# Patient Record
Sex: MALE | Race: WHITE | NOT HISPANIC OR LATINO | Employment: FULL TIME | URBAN - METROPOLITAN AREA
[De-identification: names, ages, dates, MRNs, and addresses within clinical notes are randomized per-mention and may not be internally consistent; named-entity substitution may affect disease eponyms.]

---

## 2023-04-03 ENCOUNTER — TELEPHONE (OUTPATIENT)
Dept: PRIMARY CARE | Facility: CLINIC | Age: 38
End: 2023-04-03
Payer: COMMERCIAL

## 2023-04-03 NOTE — TELEPHONE ENCOUNTER
Rx Refill Request Telephone Encounter    Name:  Júnior Cam  :  [id number]  Medication Name:  Diclofenac Sodium  Dose : 75  Route : oral  Quantity : 28 tablet  Directions : take 1 tablet twice daily  Specific Pharmacy location:  [facility], [address]  Date of last appointment:  12/15/2022  Date of next appointment:  N/A    Patient called and said he is seeing a specialist in a month, and was hoping for one more refill until he is seen.

## 2023-04-04 DIAGNOSIS — M25.50 ARTHRALGIA, UNSPECIFIED JOINT: Primary | ICD-10-CM

## 2023-04-04 RX ORDER — DICLOFENAC SODIUM 75 MG/1
75 TABLET, DELAYED RELEASE ORAL 2 TIMES DAILY PRN
Qty: 60 TABLET | Refills: 3 | Status: SHIPPED | OUTPATIENT
Start: 2023-04-04 | End: 2024-04-03

## 2023-05-12 ENCOUNTER — HOSPITAL ENCOUNTER (OUTPATIENT)
Dept: DATA CONVERSION | Facility: HOSPITAL | Age: 38
End: 2023-05-12
Attending: PHYSICAL MEDICINE & REHABILITATION | Admitting: PHYSICAL MEDICINE & REHABILITATION
Payer: COMMERCIAL

## 2023-05-12 DIAGNOSIS — M46.1 SACROILIITIS, NOT ELSEWHERE CLASSIFIED (CMS-HCC): ICD-10-CM

## 2023-09-07 VITALS
BODY MASS INDEX: 28.18 KG/M2 | SYSTOLIC BLOOD PRESSURE: 150 MMHG | WEIGHT: 196.87 LBS | DIASTOLIC BLOOD PRESSURE: 95 MMHG | HEART RATE: 76 BPM | HEIGHT: 70 IN | RESPIRATION RATE: 16 BRPM | TEMPERATURE: 97.5 F

## 2023-09-14 NOTE — H&P
History of Present Illness:   History Present Illness:  Reason for surgery: Chronic right SI Joint pain   HPI:    Chronic right SI Joint pain refractory to conservative treatment, achy and burning, worse with activity, better at rest, pain can be a 6/10.    Allergies:        Allergies:  ·  No Known Allergies :     Home Medication Review:   Home Medications Reviewed: yes     Impression/Procedure:   ·  Impression and Planned Procedure: Right  Sacroiliitis  Plan for right SI Joint injection       ERAS (Enhanced Recovery After Surgery):  ·  ERAS Patient: no       Vital Signs:  Temperature C: 36.4 degrees C   Temperature F: 97.5 degrees F   Heart Rate: 76 beats per minute   Respiratory Rate: 16 breath per minute   Blood Pressure Systolic: 150 mm/Hg   Blood Pressure Diastolic: 95 mm/Hg     Physical Exam by System:    Constitutional: Well developed, awake/alert/oriented,  no distress, alert and cooperative   Eyes: EOMI, clear sclera   ENMT: mucous membranes moist, no apparent injury,  no lesions seen   Head/Neck: Neck supple, no apparent injury, trachea  midline   Respiratory/Thorax: Patent airways, non-labored breathing  with good chest expansion   Cardiovascular: no sig. edema   Extremities: normal extremities, no cyanosis edema,  contusions or wounds, no clubbing   Psychological: Appropriate mood and behavior   Skin: Warm and dry, no lesions, no rashes     Consent:   COVID-19 Consent:  ·  COVID-19 Risk Consent Surgeon has reviewed key risks related to the risk of alfonzo COVID-19 and if they contract COVID-19 what the risks are.       Electronic Signatures:  Bridger Moore)  (Signed 12-May-2023 11:05)   Authored: History of Present Illness, Allergies, Home  Medication Review, Impression/Procedure, ERAS, Physical Exam, Consent, Note Completion      Last Updated: 12-May-2023 11:05 by Bridger Moore)

## 2023-10-02 NOTE — OP NOTE
"    Post Operative Note:     PreOp Diagnosis: Chronic right Sacroiliitis   Post-Procedure Diagnosis: same   Procedure: 1. Right SI Joint injection  2.   3.   4.   5.   Surgeon: ISABELLA Moore MD   Resident/Fellow/Other Assistant: Anuradha Bliss DO   Estimated Blood Loss (mL): none   Specimen: no   Findings: NA     Operative Report Dictated:  Dictation: not applicable - note contains Operative  Report   Operative Report:    Procedure(s):Operation: Right Sacro-Iliac Joint injection     Pre-Op/Pre-Procedure Diagnosis: Sacroiliitis  Post-Op Diagnosis: same    Anesthesia: Local only     Fluoroscopy time: See documented     Estimated Blood Loss: None  Specimens: None  Drains: None  Complications: None     INDICATIONS: The patient has been referred by my colleague with concordant subjective, objective, and radiologic findings of Sacroiliitis, referred for diagnostic and therapeutic Sacro-Iliac Joint injection(s) with failure of prior conservative care with  physical therapy and medications alone. At this time, the patient wishes to avoid surgery.      PROCEDURE: After obtaining both verbal and written informed consent, the patient was placed in a prone position on the fluoroscopic table in procedure room, the patient's posterior lumbosacral spine was prepped and draped in usual sterile fashion using  chlorhexidine. The patient was connected to noninvasive blood pressure, EKG, pulse oximetry monitoring, and monitored by a registered interventional nurse throughout the procedure. Before initiating procedure, all relevant information was verified in  a \"time-out.\"      Skin wheal(s) were raised using 1% preservative-free lidocaine near the inferior portion of the Sacro-iliac joint (s). Through the skin wheal a 22-gauge, 3-1/2-inch curved Quincke-tip spinal needle was inserted and advanced under direct fluoroscopic visualization  in the AP and lateral planes, until the needle tip entered the Sacro-iliac Joint. Proper needle " placement was confirmed with 0.2 cc of Omnipaque-180M nonionic contrast confirming intra-articular flow of contrast without any intravascular uptake of contrast  seen under direct fluoroscopic visualization in the AP, ipsilateral oblique, and lateral planes. At this point 80mg Depomedrol and 1 cc of 1.00% preservative- free Lidocaine were infused into the Sacro-Iliac Joint and gluteal musculature. Adequate hemostasis  was obtained at the needle puncture site. The patient's back was cleaned and a sterile dressing was applied. The patient was taken conscious and in stable condition to the recovery room. No complications as a result of this procedure. Post procedure precautions  and instructions were reviewed with the patient who verbalized understanding.      Significant Findings: None  Pre-Op Pain: 6/10  Post-Op Pain: 2/10  The patient had at least 3/5 positive provocative test on physical exam prior to injection.  The patient had no to minimal pain with the same provocative test after injection.     Care Instructions: Discharge per protocol.  Medications: See medication section  Appointment: Patient to return 2-3 weeks to clinic with pain diary.  Discharge Condition: Good condition for discharge.  Patient discharged home when all discharge criterion met.      Attestation:   Note Completion:  Attending Attestation I was present for the entire procedure         Electronic Signatures:  Bridger Moore)  (Signed 12-May-2023 11:32)   Authored: Post Operative Note, Note Completion      Last Updated: 12-May-2023 11:32 by Bridger Moore)

## 2023-10-28 ENCOUNTER — PREP FOR PROCEDURE (OUTPATIENT)
Dept: SURGERY | Facility: HOSPITAL | Age: 38
End: 2023-10-28

## 2023-10-28 ENCOUNTER — HOSPITAL ENCOUNTER (OUTPATIENT)
Facility: HOSPITAL | Age: 38
Setting detail: OBSERVATION
Discharge: HOME | End: 2023-10-29
Attending: INTERNAL MEDICINE | Admitting: SURGERY
Payer: COMMERCIAL

## 2023-10-28 ENCOUNTER — APPOINTMENT (OUTPATIENT)
Dept: RADIOLOGY | Facility: HOSPITAL | Age: 38
End: 2023-10-28
Payer: COMMERCIAL

## 2023-10-28 DIAGNOSIS — K35.30 ACUTE APPENDICITIS WITH LOCALIZED PERITONITIS, WITHOUT PERFORATION, ABSCESS, OR GANGRENE: Primary | ICD-10-CM

## 2023-10-28 DIAGNOSIS — K35.80 ACUTE APPENDICITIS, UNSPECIFIED ACUTE APPENDICITIS TYPE: Primary | ICD-10-CM

## 2023-10-28 DIAGNOSIS — K35.30 ACUTE APPENDICITIS WITH LOCALIZED PERITONITIS, WITHOUT PERFORATION, ABSCESS, OR GANGRENE: ICD-10-CM

## 2023-10-28 LAB
ALBUMIN SERPL BCP-MCNC: 5.2 G/DL (ref 3.4–5)
ALP SERPL-CCNC: 57 U/L (ref 33–120)
ALT SERPL W P-5'-P-CCNC: 60 U/L (ref 10–52)
ANION GAP SERPL CALC-SCNC: 12 MMOL/L (ref 10–20)
APPEARANCE UR: CLEAR
AST SERPL W P-5'-P-CCNC: 23 U/L (ref 9–39)
BASOPHILS # BLD AUTO: 0.02 X10*3/UL (ref 0–0.1)
BASOPHILS NFR BLD AUTO: 0.2 %
BILIRUB SERPL-MCNC: 0.9 MG/DL (ref 0–1.2)
BILIRUB UR STRIP.AUTO-MCNC: NEGATIVE MG/DL
BUN SERPL-MCNC: 19 MG/DL (ref 6–23)
CALCIUM SERPL-MCNC: 11.3 MG/DL (ref 8.6–10.3)
CAOX CRY #/AREA UR COMP ASSIST: NORMAL /HPF
CHLORIDE SERPL-SCNC: 102 MMOL/L (ref 98–107)
CO2 SERPL-SCNC: 27 MMOL/L (ref 21–32)
COLOR UR: YELLOW
CREAT SERPL-MCNC: 1.17 MG/DL (ref 0.5–1.3)
EOSINOPHIL # BLD AUTO: 0.07 X10*3/UL (ref 0–0.7)
EOSINOPHIL NFR BLD AUTO: 0.9 %
ERYTHROCYTE [DISTWIDTH] IN BLOOD BY AUTOMATED COUNT: 12.2 % (ref 11.5–14.5)
GFR SERPL CREATININE-BSD FRML MDRD: 83 ML/MIN/1.73M*2
GLUCOSE SERPL-MCNC: 104 MG/DL (ref 74–99)
GLUCOSE UR STRIP.AUTO-MCNC: NEGATIVE MG/DL
HCT VFR BLD AUTO: 47.9 % (ref 41–52)
HGB BLD-MCNC: 16.2 G/DL (ref 13.5–17.5)
IMM GRANULOCYTES # BLD AUTO: 0.02 X10*3/UL (ref 0–0.7)
IMM GRANULOCYTES NFR BLD AUTO: 0.2 % (ref 0–0.9)
KETONES UR STRIP.AUTO-MCNC: ABNORMAL MG/DL
LEUKOCYTE ESTERASE UR QL STRIP.AUTO: NEGATIVE
LIPASE SERPL-CCNC: 30 U/L (ref 9–82)
LYMPHOCYTES # BLD AUTO: 2.52 X10*3/UL (ref 1.2–4.8)
LYMPHOCYTES NFR BLD AUTO: 31.4 %
MCH RBC QN AUTO: 31 PG (ref 26–34)
MCHC RBC AUTO-ENTMCNC: 33.8 G/DL (ref 32–36)
MCV RBC AUTO: 92 FL (ref 80–100)
MONOCYTES # BLD AUTO: 0.73 X10*3/UL (ref 0.1–1)
MONOCYTES NFR BLD AUTO: 9.1 %
MUCOUS THREADS #/AREA URNS AUTO: NORMAL /LPF
NEUTROPHILS # BLD AUTO: 4.66 X10*3/UL (ref 1.2–7.7)
NEUTROPHILS NFR BLD AUTO: 58.2 %
NITRITE UR QL STRIP.AUTO: NEGATIVE
NRBC BLD-RTO: 0 /100 WBCS (ref 0–0)
PH UR STRIP.AUTO: 6 [PH]
PLATELET # BLD AUTO: 235 X10*3/UL (ref 150–450)
PMV BLD AUTO: 11.2 FL (ref 7.5–11.5)
POTASSIUM SERPL-SCNC: 4 MMOL/L (ref 3.5–5.3)
PROT SERPL-MCNC: 7.9 G/DL (ref 6.4–8.2)
PROT UR STRIP.AUTO-MCNC: ABNORMAL MG/DL
RBC # BLD AUTO: 5.23 X10*6/UL (ref 4.5–5.9)
RBC # UR STRIP.AUTO: NEGATIVE /UL
RBC #/AREA URNS AUTO: NORMAL /HPF
SODIUM SERPL-SCNC: 137 MMOL/L (ref 136–145)
SP GR UR STRIP.AUTO: 1.03
UROBILINOGEN UR STRIP.AUTO-MCNC: 2 MG/DL
WBC # BLD AUTO: 8 X10*3/UL (ref 4.4–11.3)
WBC #/AREA URNS AUTO: NORMAL /HPF

## 2023-10-28 PROCEDURE — 2500000004 HC RX 250 GENERAL PHARMACY W/ HCPCS (ALT 636 FOR OP/ED)

## 2023-10-28 PROCEDURE — 96361 HYDRATE IV INFUSION ADD-ON: CPT | Performed by: SURGERY

## 2023-10-28 PROCEDURE — 99285 EMERGENCY DEPT VISIT HI MDM: CPT | Mod: 25 | Performed by: INTERNAL MEDICINE

## 2023-10-28 PROCEDURE — 81001 URINALYSIS AUTO W/SCOPE: CPT | Performed by: PHYSICIAN ASSISTANT

## 2023-10-28 PROCEDURE — 74177 CT ABD & PELVIS W/CONTRAST: CPT

## 2023-10-28 PROCEDURE — 85025 COMPLETE CBC W/AUTO DIFF WBC: CPT | Performed by: PHYSICIAN ASSISTANT

## 2023-10-28 PROCEDURE — 83690 ASSAY OF LIPASE: CPT | Performed by: PHYSICIAN ASSISTANT

## 2023-10-28 PROCEDURE — 74177 CT ABD & PELVIS W/CONTRAST: CPT | Mod: FOREIGN READ | Performed by: RADIOLOGY

## 2023-10-28 PROCEDURE — 2550000001 HC RX 255 CONTRASTS: Performed by: INTERNAL MEDICINE

## 2023-10-28 PROCEDURE — 80053 COMPREHEN METABOLIC PANEL: CPT | Performed by: PHYSICIAN ASSISTANT

## 2023-10-28 PROCEDURE — 36415 COLL VENOUS BLD VENIPUNCTURE: CPT | Performed by: PHYSICIAN ASSISTANT

## 2023-10-28 RX ADMIN — SODIUM CHLORIDE, POTASSIUM CHLORIDE, SODIUM LACTATE AND CALCIUM CHLORIDE 1000 ML: 600; 310; 30; 20 INJECTION, SOLUTION INTRAVENOUS at 21:33

## 2023-10-28 RX ADMIN — IOHEXOL 75 ML: 350 INJECTION, SOLUTION INTRAVENOUS at 22:15

## 2023-10-28 ASSESSMENT — LIFESTYLE VARIABLES
HAVE PEOPLE ANNOYED YOU BY CRITICIZING YOUR DRINKING: NO
HAVE YOU EVER FELT YOU SHOULD CUT DOWN ON YOUR DRINKING: NO
REASON UNABLE TO ASSESS: NO
EVER HAD A DRINK FIRST THING IN THE MORNING TO STEADY YOUR NERVES TO GET RID OF A HANGOVER: NO
EVER FELT BAD OR GUILTY ABOUT YOUR DRINKING: NO

## 2023-10-28 ASSESSMENT — COLUMBIA-SUICIDE SEVERITY RATING SCALE - C-SSRS
2. HAVE YOU ACTUALLY HAD ANY THOUGHTS OF KILLING YOURSELF?: NO
1. IN THE PAST MONTH, HAVE YOU WISHED YOU WERE DEAD OR WISHED YOU COULD GO TO SLEEP AND NOT WAKE UP?: NO
6. HAVE YOU EVER DONE ANYTHING, STARTED TO DO ANYTHING, OR PREPARED TO DO ANYTHING TO END YOUR LIFE?: NO

## 2023-10-29 ENCOUNTER — ANESTHESIA (OUTPATIENT)
Dept: OPERATING ROOM | Facility: HOSPITAL | Age: 38
End: 2023-10-29
Payer: COMMERCIAL

## 2023-10-29 ENCOUNTER — APPOINTMENT (OUTPATIENT)
Dept: CARDIOLOGY | Facility: HOSPITAL | Age: 38
End: 2023-10-29
Payer: COMMERCIAL

## 2023-10-29 ENCOUNTER — ANESTHESIA EVENT (OUTPATIENT)
Dept: OPERATING ROOM | Facility: HOSPITAL | Age: 38
End: 2023-10-29
Payer: COMMERCIAL

## 2023-10-29 VITALS
SYSTOLIC BLOOD PRESSURE: 115 MMHG | BODY MASS INDEX: 29.64 KG/M2 | TEMPERATURE: 97 F | RESPIRATION RATE: 18 BRPM | HEART RATE: 51 BPM | HEIGHT: 70 IN | OXYGEN SATURATION: 95 % | DIASTOLIC BLOOD PRESSURE: 62 MMHG | WEIGHT: 207.01 LBS

## 2023-10-29 PROBLEM — K37 APPENDICITIS: Status: ACTIVE | Noted: 2023-10-29

## 2023-10-29 PROCEDURE — 88304 TISSUE EXAM BY PATHOLOGIST: CPT | Performed by: PATHOLOGY

## 2023-10-29 PROCEDURE — 3700000002 HC GENERAL ANESTHESIA TIME - EACH INCREMENTAL 1 MINUTE: Performed by: SURGERY

## 2023-10-29 PROCEDURE — 99222 1ST HOSP IP/OBS MODERATE 55: CPT | Performed by: NURSE PRACTITIONER

## 2023-10-29 PROCEDURE — 7100000002 HC RECOVERY ROOM TIME - EACH INCREMENTAL 1 MINUTE: Performed by: SURGERY

## 2023-10-29 PROCEDURE — A44970 PR LAP,APPENDECTOMY

## 2023-10-29 PROCEDURE — 99140 ANES COMP EMERGENCY COND: CPT | Performed by: ANESTHESIOLOGY

## 2023-10-29 PROCEDURE — G0378 HOSPITAL OBSERVATION PER HR: HCPCS

## 2023-10-29 PROCEDURE — 96365 THER/PROPH/DIAG IV INF INIT: CPT | Mod: 59 | Performed by: SURGERY

## 2023-10-29 PROCEDURE — 7100000001 HC RECOVERY ROOM TIME - INITIAL BASE CHARGE: Performed by: SURGERY

## 2023-10-29 PROCEDURE — 2500000004 HC RX 250 GENERAL PHARMACY W/ HCPCS (ALT 636 FOR OP/ED)

## 2023-10-29 PROCEDURE — 2500000004 HC RX 250 GENERAL PHARMACY W/ HCPCS (ALT 636 FOR OP/ED): Performed by: SURGERY

## 2023-10-29 PROCEDURE — 2500000005 HC RX 250 GENERAL PHARMACY W/O HCPCS

## 2023-10-29 PROCEDURE — 3600000004 HC OR TIME - INITIAL BASE CHARGE - PROCEDURE LEVEL FOUR: Performed by: SURGERY

## 2023-10-29 PROCEDURE — 88304 TISSUE EXAM BY PATHOLOGIST: CPT | Mod: TC,SUR | Performed by: SURGERY

## 2023-10-29 PROCEDURE — 93005 ELECTROCARDIOGRAM TRACING: CPT

## 2023-10-29 PROCEDURE — 2500000005 HC RX 250 GENERAL PHARMACY W/O HCPCS: Performed by: SURGERY

## 2023-10-29 PROCEDURE — 2720000007 HC OR 272 NO HCPCS: Performed by: SURGERY

## 2023-10-29 PROCEDURE — 3600000009 HC OR TIME - EACH INCREMENTAL 1 MINUTE - PROCEDURE LEVEL FOUR: Performed by: SURGERY

## 2023-10-29 PROCEDURE — 88304 TISSUE EXAM BY PATHOLOGIST: CPT | Mod: TC | Performed by: SURGERY

## 2023-10-29 PROCEDURE — 99223 1ST HOSP IP/OBS HIGH 75: CPT | Performed by: SURGERY

## 2023-10-29 PROCEDURE — A44970 PR LAP,APPENDECTOMY: Performed by: ANESTHESIOLOGY

## 2023-10-29 PROCEDURE — 3700000001 HC GENERAL ANESTHESIA TIME - INITIAL BASE CHARGE: Performed by: SURGERY

## 2023-10-29 PROCEDURE — 44970 LAPAROSCOPY APPENDECTOMY: CPT | Performed by: SURGERY

## 2023-10-29 RX ORDER — ACETAMINOPHEN 325 MG/1
650 TABLET ORAL EVERY 6 HOURS PRN
Status: DISCONTINUED | OUTPATIENT
Start: 2023-10-29 | End: 2023-10-29 | Stop reason: HOSPADM

## 2023-10-29 RX ORDER — PROMETHAZINE HYDROCHLORIDE 25 MG/1
25 SUPPOSITORY RECTAL EVERY 12 HOURS PRN
Status: DISCONTINUED | OUTPATIENT
Start: 2023-10-29 | End: 2023-10-29 | Stop reason: HOSPADM

## 2023-10-29 RX ORDER — OMEPRAZOLE 40 MG/1
1 CAPSULE, DELAYED RELEASE ORAL AS NEEDED
COMMUNITY
Start: 2017-04-21

## 2023-10-29 RX ORDER — LIDOCAINE HCL/PF 100 MG/5ML
SYRINGE (ML) INTRAVENOUS AS NEEDED
Status: DISCONTINUED | OUTPATIENT
Start: 2023-10-29 | End: 2023-10-29

## 2023-10-29 RX ORDER — GLYCOPYRROLATE 0.2 MG/ML
INJECTION INTRAMUSCULAR; INTRAVENOUS AS NEEDED
Status: DISCONTINUED | OUTPATIENT
Start: 2023-10-29 | End: 2023-10-29

## 2023-10-29 RX ORDER — HYDROMORPHONE HYDROCHLORIDE 1 MG/ML
1 INJECTION, SOLUTION INTRAMUSCULAR; INTRAVENOUS; SUBCUTANEOUS EVERY 5 MIN PRN
Status: DISCONTINUED | OUTPATIENT
Start: 2023-10-29 | End: 2023-10-29 | Stop reason: HOSPADM

## 2023-10-29 RX ORDER — KETOROLAC TROMETHAMINE 30 MG/ML
INJECTION, SOLUTION INTRAMUSCULAR; INTRAVENOUS AS NEEDED
Status: DISCONTINUED | OUTPATIENT
Start: 2023-10-29 | End: 2023-10-29

## 2023-10-29 RX ORDER — LIDOCAINE HYDROCHLORIDE 20 MG/ML
INJECTION, SOLUTION INFILTRATION; PERINEURAL AS NEEDED
Status: DISCONTINUED | OUTPATIENT
Start: 2023-10-29 | End: 2023-10-29

## 2023-10-29 RX ORDER — FAMOTIDINE 10 MG/ML
20 INJECTION INTRAVENOUS 2 TIMES DAILY
Status: DISCONTINUED | OUTPATIENT
Start: 2023-10-29 | End: 2023-10-29 | Stop reason: HOSPADM

## 2023-10-29 RX ORDER — HYDRALAZINE HYDROCHLORIDE 20 MG/ML
INJECTION INTRAMUSCULAR; INTRAVENOUS AS NEEDED
Status: DISCONTINUED | OUTPATIENT
Start: 2023-10-29 | End: 2023-10-29

## 2023-10-29 RX ORDER — LABETALOL HYDROCHLORIDE 5 MG/ML
5 INJECTION, SOLUTION INTRAVENOUS ONCE AS NEEDED
Status: DISCONTINUED | OUTPATIENT
Start: 2023-10-29 | End: 2023-10-29 | Stop reason: HOSPADM

## 2023-10-29 RX ORDER — OXYCODONE HYDROCHLORIDE 5 MG/1
5 TABLET ORAL EVERY 6 HOURS PRN
Status: DISCONTINUED | OUTPATIENT
Start: 2023-10-29 | End: 2023-10-29 | Stop reason: HOSPADM

## 2023-10-29 RX ORDER — NALOXONE HYDROCHLORIDE 1 MG/ML
0.2 INJECTION INTRAMUSCULAR; INTRAVENOUS; SUBCUTANEOUS EVERY 5 MIN PRN
Status: DISCONTINUED | OUTPATIENT
Start: 2023-10-29 | End: 2023-10-29

## 2023-10-29 RX ORDER — ROCURONIUM BROMIDE 10 MG/ML
INJECTION, SOLUTION INTRAVENOUS AS NEEDED
Status: DISCONTINUED | OUTPATIENT
Start: 2023-10-29 | End: 2023-10-29

## 2023-10-29 RX ORDER — ACETAMINOPHEN 325 MG/1
650 TABLET ORAL EVERY 4 HOURS PRN
Status: DISCONTINUED | OUTPATIENT
Start: 2023-10-29 | End: 2023-10-29 | Stop reason: HOSPADM

## 2023-10-29 RX ORDER — ONDANSETRON HYDROCHLORIDE 2 MG/ML
4 INJECTION, SOLUTION INTRAVENOUS EVERY 8 HOURS PRN
Status: DISCONTINUED | OUTPATIENT
Start: 2023-10-29 | End: 2023-10-29 | Stop reason: HOSPADM

## 2023-10-29 RX ORDER — MEPERIDINE HYDROCHLORIDE 25 MG/ML
12.5 INJECTION INTRAMUSCULAR; INTRAVENOUS; SUBCUTANEOUS EVERY 10 MIN PRN
Status: DISCONTINUED | OUTPATIENT
Start: 2023-10-29 | End: 2023-10-29 | Stop reason: HOSPADM

## 2023-10-29 RX ORDER — PROMETHAZINE HYDROCHLORIDE 25 MG/1
25 TABLET ORAL EVERY 6 HOURS PRN
Status: DISCONTINUED | OUTPATIENT
Start: 2023-10-29 | End: 2023-10-29 | Stop reason: HOSPADM

## 2023-10-29 RX ORDER — ONDANSETRON HYDROCHLORIDE 2 MG/ML
4 INJECTION, SOLUTION INTRAVENOUS ONCE AS NEEDED
Status: DISCONTINUED | OUTPATIENT
Start: 2023-10-29 | End: 2023-10-29 | Stop reason: HOSPADM

## 2023-10-29 RX ORDER — SODIUM CHLORIDE, SODIUM LACTATE, POTASSIUM CHLORIDE, CALCIUM CHLORIDE 600; 310; 30; 20 MG/100ML; MG/100ML; MG/100ML; MG/100ML
100 INJECTION, SOLUTION INTRAVENOUS CONTINUOUS
Status: DISCONTINUED | OUTPATIENT
Start: 2023-10-29 | End: 2023-10-29 | Stop reason: HOSPADM

## 2023-10-29 RX ORDER — HYDRALAZINE HYDROCHLORIDE 20 MG/ML
5 INJECTION INTRAMUSCULAR; INTRAVENOUS EVERY 30 MIN PRN
Status: DISCONTINUED | OUTPATIENT
Start: 2023-10-29 | End: 2023-10-29 | Stop reason: HOSPADM

## 2023-10-29 RX ORDER — MIDAZOLAM HYDROCHLORIDE 1 MG/ML
INJECTION, SOLUTION INTRAMUSCULAR; INTRAVENOUS AS NEEDED
Status: DISCONTINUED | OUTPATIENT
Start: 2023-10-29 | End: 2023-10-29

## 2023-10-29 RX ORDER — DIPHENHYDRAMINE HYDROCHLORIDE 50 MG/ML
INJECTION INTRAMUSCULAR; INTRAVENOUS AS NEEDED
Status: DISCONTINUED | OUTPATIENT
Start: 2023-10-29 | End: 2023-10-29

## 2023-10-29 RX ORDER — HYDROMORPHONE HYDROCHLORIDE 1 MG/ML
1 INJECTION, SOLUTION INTRAMUSCULAR; INTRAVENOUS; SUBCUTANEOUS EVERY 4 HOURS PRN
Status: DISCONTINUED | OUTPATIENT
Start: 2023-10-29 | End: 2023-10-29

## 2023-10-29 RX ORDER — ONDANSETRON 4 MG/1
4 TABLET, ORALLY DISINTEGRATING ORAL EVERY 8 HOURS PRN
Status: DISCONTINUED | OUTPATIENT
Start: 2023-10-29 | End: 2023-10-29 | Stop reason: HOSPADM

## 2023-10-29 RX ORDER — DIPHENHYDRAMINE HYDROCHLORIDE 50 MG/ML
12.5 INJECTION INTRAMUSCULAR; INTRAVENOUS ONCE AS NEEDED
Status: DISCONTINUED | OUTPATIENT
Start: 2023-10-29 | End: 2023-10-29 | Stop reason: HOSPADM

## 2023-10-29 RX ORDER — MIDAZOLAM HYDROCHLORIDE 1 MG/ML
1 INJECTION, SOLUTION INTRAMUSCULAR; INTRAVENOUS ONCE AS NEEDED
Status: DISCONTINUED | OUTPATIENT
Start: 2023-10-29 | End: 2023-10-29 | Stop reason: HOSPADM

## 2023-10-29 RX ORDER — PROPOFOL 10 MG/ML
INJECTION, EMULSION INTRAVENOUS AS NEEDED
Status: DISCONTINUED | OUTPATIENT
Start: 2023-10-29 | End: 2023-10-29

## 2023-10-29 RX ORDER — BUPIVACAINE HYDROCHLORIDE 5 MG/ML
INJECTION, SOLUTION PERINEURAL AS NEEDED
Status: DISCONTINUED | OUTPATIENT
Start: 2023-10-29 | End: 2023-10-29 | Stop reason: HOSPADM

## 2023-10-29 RX ORDER — FENTANYL CITRATE 50 UG/ML
INJECTION, SOLUTION INTRAMUSCULAR; INTRAVENOUS AS NEEDED
Status: DISCONTINUED | OUTPATIENT
Start: 2023-10-29 | End: 2023-10-29

## 2023-10-29 RX ORDER — PROMETHAZINE HYDROCHLORIDE 50 MG/ML
12.5 INJECTION, SOLUTION INTRAMUSCULAR; INTRAVENOUS ONCE AS NEEDED
Status: DISCONTINUED | OUTPATIENT
Start: 2023-10-29 | End: 2023-10-29 | Stop reason: HOSPADM

## 2023-10-29 RX ORDER — FAMOTIDINE 20 MG/1
20 TABLET, FILM COATED ORAL 2 TIMES DAILY
Status: DISCONTINUED | OUTPATIENT
Start: 2023-10-29 | End: 2023-10-29 | Stop reason: HOSPADM

## 2023-10-29 RX ORDER — DEXAMETHASONE SODIUM PHOSPHATE 4 MG/ML
INJECTION, SOLUTION INTRA-ARTICULAR; INTRALESIONAL; INTRAMUSCULAR; INTRAVENOUS; SOFT TISSUE AS NEEDED
Status: DISCONTINUED | OUTPATIENT
Start: 2023-10-29 | End: 2023-10-29

## 2023-10-29 RX ADMIN — GLYCOPYRROLATE 0.2 MG: 0.2 INJECTION, SOLUTION INTRAMUSCULAR; INTRAVENOUS at 08:36

## 2023-10-29 RX ADMIN — PIPERACILLIN SODIUM AND TAZOBACTAM SODIUM 3.38 G: 3; .375 INJECTION, SOLUTION INTRAVENOUS at 12:53

## 2023-10-29 RX ADMIN — KETOROLAC TROMETHAMINE 30 MG: 30 INJECTION, SOLUTION INTRAMUSCULAR; INTRAVENOUS at 08:25

## 2023-10-29 RX ADMIN — HYDROMORPHONE HYDROCHLORIDE 0.5 MG: 1 INJECTION, SOLUTION INTRAMUSCULAR; INTRAVENOUS; SUBCUTANEOUS at 09:58

## 2023-10-29 RX ADMIN — HYDROMORPHONE HYDROCHLORIDE 0.5 MG: 2 INJECTION, SOLUTION INTRAMUSCULAR; INTRAVENOUS; SUBCUTANEOUS at 08:20

## 2023-10-29 RX ADMIN — ROCURONIUM BROMIDE 50 MG: 10 INJECTION, SOLUTION INTRAVENOUS at 08:10

## 2023-10-29 RX ADMIN — FENTANYL CITRATE 100 MCG: 50 INJECTION, SOLUTION INTRAMUSCULAR; INTRAVENOUS at 08:09

## 2023-10-29 RX ADMIN — SUGAMMADEX 200 MG: 100 INJECTION, SOLUTION INTRAVENOUS at 09:06

## 2023-10-29 RX ADMIN — PROPOFOL 200 MG: 10 INJECTION, EMULSION INTRAVENOUS at 08:09

## 2023-10-29 RX ADMIN — HYDROMORPHONE HYDROCHLORIDE 0.5 MG: 2 INJECTION, SOLUTION INTRAMUSCULAR; INTRAVENOUS; SUBCUTANEOUS at 08:46

## 2023-10-29 RX ADMIN — LIDOCAINE HYDROCHLORIDE 60 MG: 20 INJECTION INTRAVENOUS at 08:09

## 2023-10-29 RX ADMIN — MIDAZOLAM 2 MG: 1 INJECTION INTRAMUSCULAR; INTRAVENOUS at 08:03

## 2023-10-29 RX ADMIN — PIPERACILLIN SODIUM AND TAZOBACTAM SODIUM 3.38 G: 3; .375 INJECTION, SOLUTION INTRAVENOUS at 07:07

## 2023-10-29 RX ADMIN — PIPERACILLIN SODIUM AND TAZOBACTAM SODIUM 3.38 G: 3; .375 INJECTION, SOLUTION INTRAVENOUS at 01:00

## 2023-10-29 RX ADMIN — ACETAMINOPHEN 650 MG: 325 TABLET ORAL at 13:05

## 2023-10-29 RX ADMIN — ONDANSETRON 4 MG: 2 INJECTION INTRAMUSCULAR; INTRAVENOUS at 08:25

## 2023-10-29 RX ADMIN — HYDRALAZINE HYDROCHLORIDE 5 MG: 20 INJECTION INTRAMUSCULAR; INTRAVENOUS at 08:51

## 2023-10-29 RX ADMIN — SODIUM CHLORIDE, POTASSIUM CHLORIDE, SODIUM LACTATE AND CALCIUM CHLORIDE: 600; 310; 30; 20 INJECTION, SOLUTION INTRAVENOUS at 08:02

## 2023-10-29 RX ADMIN — DIPHENHYDRAMINE HYDROCHLORIDE 12.5 MG: 50 INJECTION, SOLUTION INTRAMUSCULAR; INTRAVENOUS at 08:34

## 2023-10-29 RX ADMIN — DEXAMETHASONE SODIUM PHOSPHATE 4 MG: 4 INJECTION, SOLUTION INTRAMUSCULAR; INTRAVENOUS at 08:25

## 2023-10-29 SDOH — SOCIAL STABILITY: SOCIAL INSECURITY: HAVE YOU HAD THOUGHTS OF HARMING ANYONE ELSE?: NO

## 2023-10-29 SDOH — SOCIAL STABILITY: SOCIAL INSECURITY: DOES ANYONE TRY TO KEEP YOU FROM HAVING/CONTACTING OTHER FRIENDS OR DOING THINGS OUTSIDE YOUR HOME?: NO

## 2023-10-29 SDOH — SOCIAL STABILITY: SOCIAL INSECURITY: DO YOU FEEL ANYONE HAS EXPLOITED OR TAKEN ADVANTAGE OF YOU FINANCIALLY OR OF YOUR PERSONAL PROPERTY?: NO

## 2023-10-29 SDOH — SOCIAL STABILITY: SOCIAL INSECURITY: HAS ANYONE EVER THREATENED TO HURT YOUR FAMILY OR YOUR PETS?: NO

## 2023-10-29 SDOH — SOCIAL STABILITY: SOCIAL INSECURITY: ARE YOU OR HAVE YOU BEEN THREATENED OR ABUSED PHYSICALLY, EMOTIONALLY, OR SEXUALLY BY ANYONE?: NO

## 2023-10-29 SDOH — SOCIAL STABILITY: SOCIAL INSECURITY: ARE THERE ANY APPARENT SIGNS OF INJURIES/BEHAVIORS THAT COULD BE RELATED TO ABUSE/NEGLECT?: NO

## 2023-10-29 SDOH — SOCIAL STABILITY: SOCIAL INSECURITY: ABUSE: ADULT

## 2023-10-29 SDOH — SOCIAL STABILITY: SOCIAL INSECURITY: DO YOU FEEL UNSAFE GOING BACK TO THE PLACE WHERE YOU ARE LIVING?: NO

## 2023-10-29 ASSESSMENT — PAIN SCALES - GENERAL
PAINLEVEL_OUTOF10: 1
PAINLEVEL_OUTOF10: 2
PAIN_LEVEL: 0
PAINLEVEL_OUTOF10: 2
PAINLEVEL_OUTOF10: 6
PAINLEVEL_OUTOF10: 3
PAINLEVEL_OUTOF10: 0 - NO PAIN

## 2023-10-29 ASSESSMENT — ACTIVITIES OF DAILY LIVING (ADL)
FEEDING YOURSELF: INDEPENDENT
BATHING: INDEPENDENT
JUDGMENT_ADEQUATE_SAFELY_COMPLETE_DAILY_ACTIVITIES: YES
ADEQUATE_TO_COMPLETE_ADL: YES
GROOMING: INDEPENDENT
PATIENT'S MEMORY ADEQUATE TO SAFELY COMPLETE DAILY ACTIVITIES?: YES
TOILETING: INDEPENDENT
WALKS IN HOME: INDEPENDENT
DRESSING YOURSELF: INDEPENDENT
HEARING - LEFT EAR: FUNCTIONAL
ASSISTIVE_DEVICE: EYEGLASSES
HEARING - RIGHT EAR: FUNCTIONAL
LACK_OF_TRANSPORTATION: NO

## 2023-10-29 ASSESSMENT — ENCOUNTER SYMPTOMS
VOMITING: 0
NAUSEA: 0
ABDOMINAL PAIN: 1
DIARRHEA: 0
CONSTIPATION: 0

## 2023-10-29 ASSESSMENT — COGNITIVE AND FUNCTIONAL STATUS - GENERAL
DAILY ACTIVITIY SCORE: 24
MOBILITY SCORE: 24
PATIENT BASELINE BEDBOUND: NO
MOBILITY SCORE: 24
DAILY ACTIVITIY SCORE: 24

## 2023-10-29 ASSESSMENT — LIFESTYLE VARIABLES
HOW OFTEN DO YOU HAVE 6 OR MORE DRINKS ON ONE OCCASION: NEVER
HOW OFTEN DURING THE LAST YEAR HAVE YOU FAILED TO DO WHAT WAS NORMALLY EXPECTED FROM YOU BECAUSE OF DRINKING: NEVER
AUDIT TOTAL SCORE: 3
HOW OFTEN DURING THE LAST YEAR HAVE YOU FOUND THAT YOU WERE NOT ABLE TO STOP DRINKING ONCE YOU HAD STARTED: NEVER
SUBSTANCE_ABUSE_PAST_12_MONTHS: NO
AUDIT-C TOTAL SCORE: 3
HAVE YOU OR SOMEONE ELSE BEEN INJURED AS A RESULT OF YOUR DRINKING: NO
HOW OFTEN DURING THE LAST YEAR HAVE YOU BEEN UNABLE TO REMEMBER WHAT HAPPENED THE NIGHT BEFORE BECAUSE YOU HAD BEEN DRINKING: NEVER
HOW OFTEN DURING THE LAST YEAR HAVE YOU NEEDED AN ALCOHOLIC DRINK FIRST THING IN THE MORNING TO GET YOURSELF GOING AFTER A NIGHT OF HEAVY DRINKING: NEVER
AUDIT-C TOTAL SCORE: 3
HAS A RELATIVE, FRIEND, DOCTOR, OR ANOTHER HEALTH PROFESSIONAL EXPRESSED CONCERN ABOUT YOUR DRINKING OR SUGGESTED YOU CUT DOWN: NO
PRESCIPTION_ABUSE_PAST_12_MONTHS: NO
HOW OFTEN DURING THE LAST YEAR HAVE YOU HAD A FEELING OF GUILT OR REMORSE AFTER DRINKING: NEVER
HOW OFTEN DO YOU HAVE A DRINK CONTAINING ALCOHOL: 2-3 TIMES A WEEK
AUDIT TOTAL SCORE: 0
SKIP TO QUESTIONS 9-10: 1
HOW MANY STANDARD DRINKS CONTAINING ALCOHOL DO YOU HAVE ON A TYPICAL DAY: 1 OR 2

## 2023-10-29 ASSESSMENT — PATIENT HEALTH QUESTIONNAIRE - PHQ9
1. LITTLE INTEREST OR PLEASURE IN DOING THINGS: NOT AT ALL
SUM OF ALL RESPONSES TO PHQ9 QUESTIONS 1 & 2: 0
2. FEELING DOWN, DEPRESSED OR HOPELESS: NOT AT ALL

## 2023-10-29 ASSESSMENT — PAIN - FUNCTIONAL ASSESSMENT
PAIN_FUNCTIONAL_ASSESSMENT: 0-10

## 2023-10-29 ASSESSMENT — PAIN SCALES - PAIN ASSESSMENT IN ADVANCED DEMENTIA (PAINAD): BREATHING: NORMAL

## 2023-10-29 NOTE — ANESTHESIA POSTPROCEDURE EVALUATION
Patient: Júnior Cam    Procedure Summary       Date: 10/29/23 Room / Location: [facility] OR 02 / Virtual [facility] OR    Anesthesia Start: 0802 Anesthesia Stop:     Procedure: Appendectomy Laparoscopy Diagnosis:       Acute appendicitis with localized peritonitis, without perforation, abscess, or gangrene      (Acute appendicitis with localized peritonitis, without perforation, abscess, or gangrene [K35.30])    Surgeons: Joshua Mauricio MD Responsible Provider: Norm Ellison MD    Anesthesia Type: general ASA Status: 2 - Emergent            Anesthesia Type: general    Vitals Value Taken Time   /88 10/29/23 0926   Temp 36.7 °C (98.1 °F) 10/29/23 0926   Pulse 69 10/29/23 0926   Resp 16 10/29/23 0926   SpO2 97 % 10/29/23 0926       Anesthesia Post Evaluation    Patient location during evaluation: PACU  Patient participation: complete - patient participated  Level of consciousness: awake and alert  Pain score: 0  Pain management: adequate  Airway patency: patent  Cardiovascular status: acceptable  Respiratory status: acceptable  Hydration status: acceptable        No notable events documented.

## 2023-10-29 NOTE — H&P
History Of Present Illness  Júnior Cam is a 38 y.o. male with past medical history significant for GERD, psoriasis, and sleep apnea who presented to [facility] ED 10/28 for abdominal pain. Patient reports epigastric pain started Friday afternoon. He thought it was indigestion and took his home omperazole without relief. Pain now localized to the RLQ. Denies nausea, vomiting, or bowel complaints. Denies previous abdominal surgeries.     In the ED, patient hemodynamically stable, afebrile. Labs unremarkable, no leukocytosis. CT a/p significant for acute uncomplicated appendicitis. Patient admitted to surgery for continued management.      Past Medical History  Past Medical History:   Diagnosis Date    Personal history of diseases of the skin and subcutaneous tissue     History of psoriasis    Personal history of other diseases of the digestive system     History of gastroesophageal reflux (GERD)       Surgical History  Past Surgical History:   Procedure Laterality Date    OTHER SURGICAL HISTORY  02/18/2022    No history of surgery        Social History  He has no history on file for tobacco use, alcohol use, and drug use.    Family History  No family history on file.     Allergies  Patient has no known allergies.    Review of Systems   Gastrointestinal:  Positive for abdominal pain. Negative for constipation, diarrhea, nausea and vomiting.        Physical Exam  Constitutional:       Appearance: Normal appearance. He is normal weight.   HENT:      Head: Normocephalic and atraumatic.      Mouth/Throat:      Mouth: Mucous membranes are moist.   Cardiovascular:      Rate and Rhythm: Normal rate and regular rhythm.   Pulmonary:      Effort: Pulmonary effort is normal.      Breath sounds: Normal breath sounds.   Abdominal:      General: Abdomen is flat. Bowel sounds are normal.      Palpations: Abdomen is soft.      Tenderness: There is abdominal tenderness (RLQ).   Skin:     General: Skin is warm and dry.   Neurological:  "     General: No focal deficit present.      Mental Status: He is alert and oriented to person, place, and time.   Psychiatric:         Mood and Affect: Mood normal.          Last Recorded Vitals  Blood pressure 126/76, pulse 61, temperature 36.3 °C (97.3 °F), resp. rate 16, height 1.778 m (5' 10\"), weight 93.9 kg (207 lb), SpO2 97 %.    Relevant Results        Results for orders placed or performed during the hospital encounter of 10/28/23 (from the past 24 hour(s))   CBC and Auto Differential   Result Value Ref Range    WBC 8.0 4.4 - 11.3 x10*3/uL    nRBC 0.0 0.0 - 0.0 /100 WBCs    RBC 5.23 4.50 - 5.90 x10*6/uL    Hemoglobin 16.2 13.5 - 17.5 g/dL    Hematocrit 47.9 41.0 - 52.0 %    MCV 92 80 - 100 fL    MCH 31.0 26.0 - 34.0 pg    MCHC 33.8 32.0 - 36.0 g/dL    RDW 12.2 11.5 - 14.5 %    Platelets 235 150 - 450 x10*3/uL    MPV 11.2 7.5 - 11.5 fL    Neutrophils % 58.2 40.0 - 80.0 %    Immature Granulocytes %, Automated 0.2 0.0 - 0.9 %    Lymphocytes % 31.4 13.0 - 44.0 %    Monocytes % 9.1 2.0 - 10.0 %    Eosinophils % 0.9 0.0 - 6.0 %    Basophils % 0.2 0.0 - 2.0 %    Neutrophils Absolute 4.66 1.20 - 7.70 x10*3/uL    Immature Granulocytes Absolute, Automated 0.02 0.00 - 0.70 x10*3/uL    Lymphocytes Absolute 2.52 1.20 - 4.80 x10*3/uL    Monocytes Absolute 0.73 0.10 - 1.00 x10*3/uL    Eosinophils Absolute 0.07 0.00 - 0.70 x10*3/uL    Basophils Absolute 0.02 0.00 - 0.10 x10*3/uL   Comprehensive metabolic panel   Result Value Ref Range    Glucose 104 (H) 74 - 99 mg/dL    Sodium 137 136 - 145 mmol/L    Potassium 4.0 3.5 - 5.3 mmol/L    Chloride 102 98 - 107 mmol/L    Bicarbonate 27 21 - 32 mmol/L    Anion Gap 12 10 - 20 mmol/L    Urea Nitrogen 19 6 - 23 mg/dL    Creatinine 1.17 0.50 - 1.30 mg/dL    eGFR 83 >60 mL/min/1.73m*2    Calcium 11.3 (H) 8.6 - 10.3 mg/dL    Albumin 5.2 (H) 3.4 - 5.0 g/dL    Alkaline Phosphatase 57 33 - 120 U/L    Total Protein 7.9 6.4 - 8.2 g/dL    AST 23 9 - 39 U/L    Bilirubin, Total 0.9 0.0 - 1.2 " mg/dL    ALT 60 (H) 10 - 52 U/L   Lipase   Result Value Ref Range    Lipase 30 9 - 82 U/L   Urinalysis with Reflex Microscopic and Culture   Result Value Ref Range    Color, Urine Yellow Straw, Yellow    Appearance, Urine Clear Clear    Specific Gravity, Urine 1.029 1.005 - 1.035    pH, Urine 6.0 5.0, 5.5, 6.0, 6.5, 7.0, 7.5, 8.0    Protein, Urine 30 (1+) (N) NEGATIVE mg/dL    Glucose, Urine NEGATIVE NEGATIVE mg/dL    Blood, Urine NEGATIVE NEGATIVE    Ketones, Urine 5 (TRACE) (A) NEGATIVE mg/dL    Bilirubin, Urine NEGATIVE NEGATIVE    Urobilinogen, Urine 2.0 (N) <2.0 mg/dL    Nitrite, Urine NEGATIVE NEGATIVE    Leukocyte Esterase, Urine NEGATIVE NEGATIVE   Urinalysis Microscopic   Result Value Ref Range    WBC, Urine 1-5 1-5, NONE /HPF    RBC, Urine 1-2 NONE, 1-2, 3-5 /HPF    Mucus, Urine 3+ Reference range not established. /LPF    Calcium Oxalate Crystals, Urine 1+ NONE, 1+ /HPF     CT abdomen pelvis w IV contrast    Result Date: 10/28/2023  STUDY: CT Abdomen and Pelvis with IV Contrast; 10/28/2023 at 10:16 p.m. INDICATION: Right lower quadrant abdominal pain. COMPARISON: XR right hip with pelvis 1/27/2023. ACCESSION NUMBER(S): [id number] ORDERING CLINICIAN: Cherelle Siegel TECHNIQUE: CT of the abdomen and pelvis was performed.  Contiguous axial images were obtained at 3 mm slice thickness through the abdomen and pelvis. Coronal and sagittal reconstructions at 3 mm slice thickness were performed.  Omnipaque 350 75 mL was administered intravenously.  FINDINGS: LOWER CHEST: No cardiomegaly.  No pericardial effusion.  Lung bases are clear.  ABDOMEN:  LIVER: No hepatomegaly.  Smooth surface contour.  Normal attenuation.  BILE DUCTS: No intrahepatic or extrahepatic biliary ductal dilatation.  GALLBLADDER: The gallbladder is normal. STOMACH: No abnormalities identified.  PANCREAS: No masses or ductal dilatation.  SPLEEN: No splenomegaly or focal splenic lesion.  ADRENAL GLANDS: No thickening or nodules.  KIDNEYS AND  URETERS: Kidneys are normal in size and location.  No renal or ureteral calculi.  PELVIS:  BLADDER: No abnormalities identified.  REPRODUCTIVE ORGANS: No abnormalities identified.  BOWEL: No abnormalities identified.  VESSELS: No abnormalities identified.  Abdominal aorta is normal in caliber.  PERITONEUM/RETROPERITONEUM/LYMPH NODES: No free fluid.  No pneumoperitoneum. No lymphadenopathy.  ABDOMINAL WALL: No abnormalities identified. SOFT TISSUES: No abnormalities identified.  BONES: No acute fracture or aggressive osseous lesion.    1.  Mildly enlarged appendix measuring up to 9 mm with periappendiceal stranding.  Findings consistent with acute uncomplicated appendicitis. 2.  Fatty liver. 3.  Diverticulosis.  No features of acute diverticulitis. Signed by Teo Balderas MD        Assessment/Plan   Principal Problem:    Acute appendicitis with localized peritonitis, without perforation, abscess, or gangrene  Active Problems:    Appendicitis    Júnior Cam is a 38 y.o. male with past medical history significant for GERD, psoriasis, and sleep apnea who presented to [facility] ED 10/28 for abdominal pain. In the ED, patient hemodynamically stable, afebrile. Labs unremarkable, no leukocytosis. CT a/p significant for acute uncomplicated appendicitis. Patient admitted to surgery for continued management.     Assessment/Plan:    # Acute appendicitis  - plan for laparoscopic appendectomy at 0800 with Dr. Mauricio  - NPO, IVF while NPO  - Zosyn Q6H  - multimodal pain control  - zofran PRN for nausea  - am labs    # DVT ppx   - low risk  - SCD's, ambulate    Dispo: admit to surgical floor. OR today. Likely DC this evening vs tomorrow    Patient discussed with elva Cook as above.    I spent 30 minutes in the professional and overall care of this patient.      Damaris Mora, APRN-CNP

## 2023-10-29 NOTE — DISCHARGE INSTRUCTIONS
1.  May discharge the patient to home.  2.  Diet:  No Restrictions  3.  Activity: As tolerated;  no lifting > 15 lbs.    4.  May shower: plastic dressing and/or incision (including steri-strips) may get wet  5.  Dressing changes:  Remove clear plastic dressing and gauze on postoperative day #2; may then keep incision open to air; steri-strips will peel off gradually in 7-10 days  6.  Driving: May drive when no longer using narcotic analgesics   7.  Follow-up: Please call my office ([phone]) and make a follow-up appointment be seen in 2 weeks  8.  For postoperative analgesia/pain relief, I recommend: Tylenol Extra Strength 500 mg with ibuprofen 600 mg (three, 200 mg Advil or Motrin tablets ) 4 times a day with food, as needed, as needed for mild to moderate pain

## 2023-10-29 NOTE — HOSPITAL COURSE
Júnior Cam is a 38 y.o. male with past medical history significant for GERD, psoriasis, and sleep apnea who presented to [facility] ED 10/28 for abdominal pain. Patient reports epigastric pain started Friday afternoon. He thought it was indigestion and took his home omperazole without relief. Pain now localized to the RLQ. Denies nausea, vomiting, or bowel complaints. Denies previous abdominal surgeries.      In the ED, patient hemodynamically stable, afebrile. Labs unremarkable, no leukocytosis. CT a/p significant for acute uncomplicated appendicitis. Patient admitted to surgery for continued management.     S/p lap appy 10/29 with Dr. Mauricio. Post op course uncomplicated. At the time of discharge, patient's pain was controlled with oral analgesia, patient was urinating, having BMs, sleeping, and eating well. Patient was discharged home with scripts and follow up appointments. Discharge plan was discussed with the patient and all of the patient's questions were answered.

## 2023-10-29 NOTE — H&P
General Surgery Attending Note    My Acute Care Surgery HILARY (Advanced Practice Provider) evaluated this patient today.  Please see that documentation for details.    I saw the patient with the HILARY today, and personally evaluated and examined the patient.  My findings are consistent with those of the HILARY.        Impression:      This otherwise healthy 38-year-old male presented to the emergency department last evening with atypical history, examination, and imaging studies consistent with a diagnosis of early acute appendicitis.    On 10/27/2023 the patient developed diffuse epigastric abdominal discomfort which over the next 12 hours migrated to the right lower quadrant.  He has no other associated symptoms such as fever chills sweats nausea vomiting diarrhea or constipation.  Patient was brought to emergency department for persistent pain.    On examination, the patient has mild tenderness at McBurney's point in the right lower quadrant.  He appears mildly ill.  He is afebrile with normal vital signs.    Laboratory values reveal no leukocytosis or left shift.  Chemistries are satisfactory.    CT scan of the abdomen pelvis with IV contrast was performed.  I personally reviewed the report and the images.  The patient has a mildly thickened appendix to 9 mm, with some very mild periappendiceal stranding.  The appendix  appears to track lateral to the cecum.    Impression is early acute appendicitis      Plan:      Patient will be placed on IV fluids, antibiotics, analgesics and anti-emetics as indicated.  The patient will be taken to the operating room for a laparoscopic, possible open, appendectomy.    Laparoscopic Appendectomy Consent: The procedure was explained to the patient in detail, including the risks, benefits and alternatives. The risks include, but not limited to, infection, abscess, bleeding, need for transfusion, hematoma, seroma, poor wound healing, incisional hernia, conversion to an open appendectomy,  need for further surgery, postoperative bowel obstruction, intestinal leak and fistula  The patient agreed with plan and signed the electronic consent.

## 2023-10-29 NOTE — ED PROVIDER NOTES
HPI   Chief Complaint   Patient presents with    Abdominal Pain     Pt started getting abdominal pain since yesterday. Pts LBM today at 1330. 0 n/v.        HPI  Patient is a 38-year-old male with a past medical history of psoriasis, sleep apnea, GERD who presented to [facility] ED complaining of right lower quadrant pain.  Patient reported that he was at work yesterday and had pizza for lunch, he started having epigastric pain which he attributed to his GERD and took omeprazole with little relief.  The pain moved to his lower abdomen and eventually localized to his right lower quadrant.  He reported loss of appetite.  Patient reported drinking about 5 times per week a couple beers.  Denies any nausea, vomiting, fever, chills, history of kidney stones, melena, diarrhea, constipation.                  No data recorded                Patient History   Past Medical History:   Diagnosis Date    Personal history of diseases of the skin and subcutaneous tissue     History of psoriasis    Personal history of other diseases of the digestive system     History of gastroesophageal reflux (GERD)     Past Surgical History:   Procedure Laterality Date    OTHER SURGICAL HISTORY  02/18/2022    No history of surgery     No family history on file.  Social History     Tobacco Use    Smoking status: Not on file    Smokeless tobacco: Not on file   Substance Use Topics    Alcohol use: Not on file    Drug use: Not on file       Physical Exam   ED Triage Vitals   Temp Heart Rate Resp BP   10/28/23 1756 10/28/23 1756 10/28/23 1756 10/28/23 1758   36.3 °C (97.3 °F) 98 18 (!) 160/98      SpO2 Temp src Heart Rate Source Patient Position   10/28/23 1756 -- -- --   97 %         BP Location FiO2 (%)     -- --             Physical Exam  Constitutional:       General: He is not in acute distress.     Appearance: He is well-developed. He is not ill-appearing or diaphoretic.   Cardiovascular:      Rate and Rhythm: Normal rate and regular rhythm.       Heart sounds: Normal heart sounds. No murmur heard.  Pulmonary:      Effort: Pulmonary effort is normal. No respiratory distress.      Breath sounds: Normal breath sounds. No wheezing.   Abdominal:      General: Bowel sounds are normal. There is no distension.      Tenderness: There is abdominal tenderness in the right lower quadrant. There is no right CVA tenderness, left CVA tenderness or guarding. Negative signs include Doty's sign.   Neurological:      Mental Status: He is alert and oriented to person, place, and time.         ED Course & MDM   ED Course as of 10/29/23 0011   Sat Oct 28, 2023   2127 Patient is a 38-year-old male who presented with right lower quadrant pain that began yesterday.  CMP, CBC, lipase, urinalysis, CT abdomen and 1 L LR bolus ordered. []   2128 Differential diagnoses include appendicitis, nephrolithiasis, PUD, gastroenteritis. []   2331 UA, CBC, CMP normal.  CT abdomen showed acute uncomplicated appendicitis.  Will speak with surgery. []   Sun Oct 29, 2023   0008 N.p.o. at midnight, Zosyn ordered.  Final diagnosis acute appendicitis.  Admitted to surgery service. []      ED Course User Index  [] Cherelle Siegel DO         Diagnoses as of 10/29/23 0011   Acute appendicitis, unspecified acute appendicitis type       Medical Decision Making      Procedure  ECG 12 lead    Performed by: Cherelle Siegel DO  Authorized by: DO Cherelle Yanes DO  Resident  10/29/23 0143       Cherelle Siegel DO  Resident  10/29/23 0338

## 2023-10-29 NOTE — CARE PLAN
Problem: Pain - Adult  Goal: Verbalizes/displays adequate comfort level or baseline comfort level  10/29/2023 0911 by Yulia Elam RN  Outcome: Progressing  10/29/2023 0911 by Yulia Elam RN  Outcome: Progressing     Problem: Safety - Adult  Goal: Free from fall injury  10/29/2023 0911 by Yulia Elam RN  Outcome: Progressing  10/29/2023 0911 by Yulia Elam RN  Outcome: Progressing     Problem: Pain  Goal: My pain/discomfort is manageable  Outcome: Progressing     Problem: Safety  Goal: Patient will be injury free during hospitalization  Outcome: Progressing  Goal: I will remain free of falls  Outcome: Progressing     Problem: Daily Care  Goal: Daily care needs are met  Outcome: Progressing     Problem: Psychosocial Needs  Goal: Demonstrates ability to cope with hospitalization/illness  Outcome: Progressing  Goal: Collaborate with me, my family, and caregiver to identify my specific goals  Outcome: Progressing     Problem: Discharge Barriers  Goal: My discharge needs are met  Outcome: Progressing

## 2023-10-29 NOTE — ANESTHESIA PROCEDURE NOTES
Airway  Date/Time: 10/29/2023 8:11 AM  Urgency: elective    Airway not difficult    Staffing  Performed: CRNA and attending   Authorized by: Norm Ellison MD    Performed by: NEPTALI Bullard-CRNA  Patient location during procedure: OR    Indications and Patient Condition  Indications for airway management: anesthesia  Spontaneous ventilation: present  Sedation level: deep  Preoxygenated: yes  Patient position: sniffing  Mask difficulty assessment: 1 - vent by mask  Planned trial extubation    Final Airway Details  Final airway type: endotracheal airway      Cuffed: yes   Successful intubation technique: direct laryngoscopy  Facilitating devices/methods: intubating stylet  Endotracheal tube insertion site: oral  Blade: Sorin  Blade size: #4  Cormack-Lehane Classification: grade IIa - partial view of glottis  Placement verified by: chest auscultation, capnometry and palpation of cuff   Measured from: lips  ETT to lips (cm): 21  Number of attempts at approach: 1

## 2023-10-29 NOTE — DISCHARGE SUMMARY
Discharge Diagnosis  Acute appendicitis with localized peritonitis, without perforation, abscess, or gangrene    Issues Requiring Follow-Up  S/p lap appy    Test Results Pending At Discharge  Pending Labs       Order Current Status    Extra Urine Gray Tube Collected (10/28/23 6406)    Surgical Pathology Exam Collected (10/29/23 5842)    Urinalysis with Reflex Microscopic and Culture In process            Hospital Course  Júnior Cam is a 38 y.o. male with past medical history significant for GERD, psoriasis, and sleep apnea who presented to [facility] ED 10/28 for abdominal pain. Patient reports epigastric pain started Friday afternoon. He thought it was indigestion and took his home omperazole without relief. Pain now localized to the RLQ. Denies nausea, vomiting, or bowel complaints. Denies previous abdominal surgeries.      In the ED, patient hemodynamically stable, afebrile. Labs unremarkable, no leukocytosis. CT a/p significant for acute uncomplicated appendicitis. Patient admitted to surgery for continued management.     S/p lap appy 10/29 with Dr. Mauricio. Post op course uncomplicated. At the time of discharge, patient's pain was controlled with oral analgesia, patient was urinating, having BMs, sleeping, and eating well. Patient was discharged home with scripts and follow up appointments. Discharge plan was discussed with the patient and all of the patient's questions were answered.      Pertinent Physical Exam At Time of Discharge  Physical Exam  Constitutional:       Appearance: Normal appearance. He is normal weight.   HENT:      Head: Normocephalic and atraumatic.      Mouth/Throat:      Mouth: Mucous membranes are moist.   Cardiovascular:      Rate and Rhythm: Normal rate and regular rhythm.   Pulmonary:      Effort: Pulmonary effort is normal.      Breath sounds: Normal breath sounds.   Abdominal:      General: Abdomen is flat. Bowel sounds are normal.      Palpations: Abdomen is soft.      Tenderness:  There is no abdominal tenderness.      Comments: Surgical incisions c/d/i   Skin:     General: Skin is warm and dry.   Neurological:      General: No focal deficit present.      Mental Status: He is alert and oriented to person, place, and time.   Psychiatric:         Mood and Affect: Mood normal.         Home Medications     Medication List      CONTINUE taking these medications     diclofenac 75 mg EC tablet; Commonly known as: Voltaren; Take 1 tablet   (75 mg) by mouth 2 times a day as needed (pain). Do not crush, chew, or   split.   omeprazole 40 mg DR capsule; Commonly known as: PriLOSEC       Outpatient Follow-Up  No future appointments.    Damaris Mora, APRN-CNP

## 2023-10-29 NOTE — ANESTHESIA PREPROCEDURE EVALUATION
Patient: Júnior Cam    Procedure Information       Date/Time: 10/29/23 0800    Procedure: Appendectomy Laparoscopy    Location: [facility] OR 02 / [facility] OR    Surgeons: Joshua Mauricio MD            Relevant Problems   No relevant active problems       Clinical information reviewed:   Tobacco  Allergies  Meds  Problems  Med Hx  Surg Hx   Fam Hx  Soc   Hx        NPO Detail:  No data recorded     Physical Exam    Airway  Mallampati: II  TM distance: >3 FB  Neck ROM: full     Cardiovascular - normal exam  Rhythm: regular  Rate: normal     Dental - normal exam     Pulmonary - normal exam     Abdominal            Anesthesia Plan    ASA 2 - emergent     general     intravenous induction   Anesthetic plan and risks discussed with patient.    Plan discussed with CRNA.

## 2023-10-29 NOTE — CARE PLAN
Problem: Pain - Adult  Goal: Verbalizes/displays adequate comfort level or baseline comfort level  10/29/2023 1723 by Yulia Elam RN  Outcome: Met  10/29/2023 0911 by Yulia Elam RN  Outcome: Progressing  10/29/2023 0911 by Yulia Elam RN  Outcome: Progressing     Problem: Safety - Adult  Goal: Free from fall injury  10/29/2023 1723 by Yulia Elam RN  Outcome: Met  10/29/2023 0911 by Yulia Elam RN  Outcome: Progressing  10/29/2023 0911 by Yulia Elam RN  Outcome: Progressing     Problem: Pain  Goal: My pain/discomfort is manageable  10/29/2023 1723 by Yulia Elam RN  Outcome: Met  10/29/2023 0911 by Yulia Elam RN  Outcome: Progressing     Problem: Safety  Goal: Patient will be injury free during hospitalization  10/29/2023 1723 by Yulia Elam RN  Outcome: Met  10/29/2023 0911 by Yulia Elam RN  Outcome: Progressing  Goal: I will remain free of falls  10/29/2023 1723 by Yulia Elam RN  Outcome: Met  10/29/2023 0911 by Yulia Elam RN  Outcome: Progressing     Problem: Daily Care  Goal: Daily care needs are met  10/29/2023 1723 by Yulia Elam RN  Outcome: Met  10/29/2023 0911 by Yulia Elam RN  Outcome: Progressing     Problem: Psychosocial Needs  Goal: Demonstrates ability to cope with hospitalization/illness  10/29/2023 1723 by Yulia Elam RN  Outcome: Met  10/29/2023 0911 by Yulia Elam RN  Outcome: Progressing  Goal: Collaborate with me, my family, and caregiver to identify my specific goals  10/29/2023 1723 by Yulia Elam RN  Outcome: Met  10/29/2023 0911 by Yulia Elam RN  Outcome: Progressing     Problem: Discharge Barriers  Goal: My discharge needs are met  10/29/2023 1723 by Yulia Elam RN  Outcome: Met  10/29/2023 0911 by Yulia Elam RN  Outcome: Progressing

## 2023-10-29 NOTE — OP NOTE
Appendectomy Laparoscopy Operative Note     Date: 10/28/2023 - 10/29/2023  OR Location: [facility] OR    Name: Júnior Cam, : 1985, Age: 38 y.o., MRN: [id number], Sex: male    Diagnosis  Pre-op Diagnosis     * Acute appendicitis with localized peritonitis, without perforation, abscess, or gangrene [K35.30] Post-op Diagnosis     * Acute appendicitis with localized peritonitis, without perforation, abscess, or gangrene [K35.30]     Procedures  Appendectomy Laparoscopy  31283 - NM LAPAROSCOPIC APPENDECTOMY      Surgeons      * Joshua Mauricio - Primary    Resident/Fellow/Other Assistant:  Surgeon(s) and Role: Yash Spain S.A.    Procedure Summary  Anesthesia: General  ASA: ASA status not filed in the log.  Anesthesia Staff: Anesthesiologist: Norm Ellison MD  CRNA: NEPTALI Bullard-CRNA  Estimated Blood Loss: Less than 15 mL  Intra-op Medications: * Intraprocedure medication information is unavailable because the case start and end events have not been set *      Intraprocedure I/O Totals       None           Specimen:   ID Type Source Tests Collected by Time   1 :  Tissue APPENDIX SURGICAL PATHOLOGY EXAM Joshua Mauricio MD 10/29/2023 0752        Staff:   Circulator: Chyna Nguyen RN  Scrub Person: Chandrakant Staples         Drains and/or Catheters: * None in log *    Tourniquet Times:         Implants:     Findings: Correlating with the CT imaging, the appendix was located along the lateral aspect of the cecum.  The mesoappendix was thickened and edematous.  The appendix was mildly thickened and erythematous.  There is no free fluid or abscess.  There is no evidence of perforation.    Indications: Júnior Cam is an 38 y.o. male who is having surgery for Acute appendicitis with localized peritonitis, without perforation, abscess, or gangrene [K35.30].     The patient was seen in the preoperative area. The risks, benefits, complications, treatment options, non-operative alternatives, expected recovery and  outcomes were discussed with the patient. The possibilities of reaction to medication, pulmonary aspiration, injury to surrounding structures, bleeding, recurrent infection, the need for additional procedures, failure to diagnose a condition, and creating a complication requiring transfusion or operation were discussed with the patient. The patient concurred with the proposed plan, giving informed consent.  The site of surgery was properly noted/marked if necessary per policy. The patient has been actively warmed in preoperative area.  The patient is receiving IV Zosyn antibiotic.  Venous thrombosis prophylaxis have been ordered including bilateral sequential compression devices    Procedure Details:     Findings:  See above    Specimens:  Appendix    Procedure:    The patient was taken to the operating room and placed on the table in the supine position. Preoperative huddle was accomplished with the OR team and the patient.  Satisfactory general endotracheal anesthesia was achieved by the anesthesia service. A Hawkins catheter was placed sterilely in the urinary bladder. The Hawkins catheter was removed at the conclusion of the operation.  The left arm was tucked. The abdomen was then widely prepared and draped in the normal sterile fashion. After the appropriate timeout, the case commenced.    A 2 cm infraumbilical curvilinear incision was made and the skin and subcutaneous tissues were divided down to the infraumbilical fascia, which was elevated. A 10 mm transverse incision was made in the infraumbilical fascia, and access was obtained to the peritoneal cavity. The Vega trocar was placed through this fascial defect and secured with the balloon.  Pneumoperitoneum was achieved in the normal fashion. The patient was placed in Trendelenburg position & tilted the left. The laparoscope was inserted. Under direct vision, 5 mm suprapubic and left lower quadrant trochars were placed. The above findings were noted.    The  appendix was identified grasped and elevated. A window was made between the appendix and the mesoappendix.  The mesoappendix was divided using multiple firings of a 45 mm linear vascular endoscopic stapling device. The appendix was divided at its' base withone of these firings. The appendix was placed in an Endobag, and removed through the umbilical fascial defect without difficulty, and examined on the back table.  There was an intact staple line at the proximal or base of the appendix.  The appendix was then sent to pathology as a specimen.    The right lower quadrant was copiously irrigated and suctioned. There was no evidence of bleeding or leak. The mesoappendix and cecal staple lines were examined, and were dry and intact. Under direct vision, the trochars were removed and there was no evidence of bleeding. The pneumoperitoneum was evacuated. The infraumbilical fascial defect was approximated using interrupted figure-of-eight 0 Vicryl sutures. Subcutaneous tissues at the site were approximated using interrupted 3-0 Vicryl sutures. The skin at each incision was approximated using running 4-0 undyed subcuticular Vicryl sutures. The wounds were cleaned and dried, benzoin and Steri-Strips were placed, followed by dry sterile bio-occlusive dressings.    The patient tolerated the procedure well. Sponge, needle, and instrument counts were correct times 2. Total IVF were 1000 cc of crystalloid, EBL was < 15 cc, and urine output 250 cc over 30 minutes.  The patient was extubated in the operating room, and taken to the PACU with stable vital signs and in excellent condition.    Joshua Mauricio M.D.  Complications:  None; patient tolerated the procedure well.    Disposition: PACU - hemodynamically stable.  Condition: stable         Additional Details: none    Attending Attestation: I performed the procedure.    Joshua Mauricio  Phone Number: [phone]

## 2023-11-02 ENCOUNTER — TELEPHONE (OUTPATIENT)
Dept: SURGERY | Facility: CLINIC | Age: 38
End: 2023-11-02
Payer: COMMERCIAL

## 2023-11-02 NOTE — TELEPHONE ENCOUNTER
The pt's girlfriend phones in, left me a voicemail stating pt is having a lot of bruising.  I called her back to get more info before inquiring w/Dr. Mauricio.  I left her a vm to call me back.

## 2023-11-02 NOTE — TELEPHONE ENCOUNTER
I spoke w/pt's zofia Jackson.  States the incision hossein is ok, just bruised.  States this is pts 1st surgery. So he is just concerned.  Wanted to know if he could take off bandage, I recommended he keep on till it dissolves/falls off.  I told her to watch for signs of infection, and to call should any other concerns arise.

## 2023-11-09 LAB
LABORATORY COMMENT REPORT: NORMAL
PATH REPORT.FINAL DX SPEC: NORMAL
PATH REPORT.GROSS SPEC: NORMAL
PATH REPORT.RELEVANT HX SPEC: NORMAL
PATH REPORT.TOTAL CANCER: NORMAL

## 2023-11-14 NOTE — PROGRESS NOTES
Post-Hospitalization/Post-Operative Office Visit  Júnior Cam presents for his posthospitalization/postoperative visit.  He was recently admitted to [facility] from 10/28/2023 - 10/29/2023.  Please see the discharge summary for details as well as the admitting history and physical.  Patient was taken the operating on the morning of 10/29/2023 and underwent an uneventful laparoscopic appendectomy.  He was discharged that evening.  Pathology confirmed acute appendicitis.    Patient's postoperative course has been completely unremarkable.  He took a couple Tylenol a day following discharge and none since.  No abdominal pain or GI symptoms.  No fever chills or sweats.  He did return to work light duty.  He is a  (angel).      Vitals  There were no vitals taken for this visit.     Exam    Post Op Abdominal Physical Exam    The physical exam findings are as follows:    General  General Appearance - Not in acute distress.    Integumentary  Abdominal Incision - Laparoscopic x3 lower abdomen including transverse infraumbilical - dry, Intact, No evidence of infection, hematoma, or seroma, edges well-approximated.  No drainage present, no redness and no warmth to the touch.  Mild healing ridge umbilical incision    Chest and Lung Exam  Auscultation:  Breath sounds: - Normal and equal bilaterally.    Adventitious sounds: none    Cardiovascular  Auscultation: Rhythm - Regular. Rate - Regular.  Heart Sounds - Normal heart sounds.    Abdomen  Inspection: - Inspection Normal.  Palpation/Percussion: Palpation and Percussion of the abdomen reveal - Non Tender, No Rebound tenderness, No Rigidity (guarding) and No Palpable abdominal masses.  Liver: - Normal.  Spleen: - Normal.  Auscultation: Auscultation of the abdomen reveals - Bowel sounds normal and No Abdominal bruits.  Surgical scars: Laparoscopic x3 lower abdomen including transverse infraumbilical      Assessment and Plan    Mr. Cam has done very well  postoperatively.    Recommendations:     Hardness along the incisions will resolve in 2-4 months and continue to remodel for up to a year    Starting Monday, 11/27/2023, may resume full activity, lifting, exercise, etc. without restrictions    Follow-up as needed    Recommendations:

## 2023-11-15 ENCOUNTER — OFFICE VISIT (OUTPATIENT)
Dept: SURGERY | Facility: CLINIC | Age: 38
End: 2023-11-15
Payer: COMMERCIAL

## 2023-11-15 VITALS
WEIGHT: 203 LBS | OXYGEN SATURATION: 98 % | SYSTOLIC BLOOD PRESSURE: 130 MMHG | HEART RATE: 84 BPM | HEIGHT: 70 IN | DIASTOLIC BLOOD PRESSURE: 90 MMHG | BODY MASS INDEX: 29.06 KG/M2 | TEMPERATURE: 98.1 F

## 2023-11-15 DIAGNOSIS — K35.30 ACUTE APPENDICITIS WITH LOCALIZED PERITONITIS, WITHOUT PERFORATION, ABSCESS, OR GANGRENE: Primary | ICD-10-CM

## 2023-11-15 PROCEDURE — 1036F TOBACCO NON-USER: CPT | Performed by: SURGERY

## 2023-11-15 PROCEDURE — 99024 POSTOP FOLLOW-UP VISIT: CPT | Performed by: SURGERY

## 2023-11-15 ASSESSMENT — PAIN SCALES - GENERAL: PAINLEVEL: 0-NO PAIN

## 2024-01-10 LAB
ATRIAL RATE: 55 BPM
P AXIS: 13 DEGREES
PR INTERVAL: 173 MS
Q ONSET: 249 MS
QRS COUNT: 9 BEATS
QRS DURATION: 94 MS
QT INTERVAL: 388 MS
QTC CALCULATION(BAZETT): 371 MS
QTC FREDERICIA: 377 MS
R AXIS: 27 DEGREES
T AXIS: -13 DEGREES
T OFFSET: 443 MS
VENTRICULAR RATE: 55 BPM

## 2024-03-11 ENCOUNTER — OFFICE VISIT (OUTPATIENT)
Dept: SLEEP MEDICINE | Facility: CLINIC | Age: 39
End: 2024-03-11
Payer: COMMERCIAL

## 2024-03-11 VITALS
SYSTOLIC BLOOD PRESSURE: 124 MMHG | BODY MASS INDEX: 29.41 KG/M2 | DIASTOLIC BLOOD PRESSURE: 86 MMHG | WEIGHT: 205 LBS | TEMPERATURE: 97.7 F | HEART RATE: 80 BPM

## 2024-03-11 DIAGNOSIS — G47.33 OBSTRUCTIVE SLEEP APNEA: Primary | ICD-10-CM

## 2024-03-11 PROCEDURE — 1036F TOBACCO NON-USER: CPT | Performed by: PHYSICIAN ASSISTANT

## 2024-03-11 PROCEDURE — 99213 OFFICE O/P EST LOW 20 MIN: CPT | Performed by: PHYSICIAN ASSISTANT

## 2024-03-11 NOTE — PATIENT INSTRUCTIONS
[facility] Sleep Medicine  DO [address]  [address] [address]       NAME: Júnior Cam   DATE: 03/11/24    Your Sleep Provider Today: Shavonne Nova PA-C  Your Primary Care Physician: Yordan Villafuerte, DO   Your Referring Provider: No ref. provider found    DIAGNOSIS:   1. Obstructive sleep apnea  Positive Airway Pressure (PAP) Therapy          Thank you for coming to the Sleep Medicine Clinic today! Your sleep medicine provider today was: Shavonne Nova PA-C Below is a summary of your treatment plan, other important information, and our contact numbers:      TREATMENT PLAN       Instructions - Common OTILIA Recs: - For your sleep apnea, continue to use your PAP every night and use it whenever you are sleeping.   - Avoid alcohol or sedatives several hours prior to sleeping.   - Get additional supplies for your PAP (e.g., mask, hose, filters) every 3 months or as your insurance allows from your [employer] company. Replacement cushions for your PAP mask can be requested monthly if airseals are an issue.  - Remember to clean your mask, tubings, and water chamber regularly as instructed.  - Avoid driving or operating heavy machinery when drowsy. A person driving while sleepy is five (5) times more likely to have an accident. If you feel sleepy, pull over and take a short power nap (sleep for less than 30 minutes). Otherwise, ask somebody to drive you.        Follow-up Appointment:   1 year      IMPORTANT INFORMATION     Call 911 for medical emergencies.  Our offices are generally open from Monday-Friday, 9 am - 5 pm.  If you need to get in touch with me, you may either call me and my team(number is below) or you can use [employer].  If a referral for a test, for CPAP, or for another specialist was made, and you have not heard about scheduling this within a week, please call scheduling at [phone]).  If you are unable to make your appointment for clinic or an  overnight study, kindly call the office at least 48 hours in advance to cancel and reschedule.  If you are on CPAP, please bring your device's card or the device to each clinic appointment.   There are no supporting services by either the sleep doctors or their staff on weekends and Holidays, or after 5 PM on weekdays.   If you have been asked to come to a sleep study, make sure you bring toiletries, a comfy pillow, and any nighttime medications that you may regularly take. Also be sure to eat dinner before you arrive. We generally do not provide meals.      PRESCRIPTIONS     We require 7 days advanced notice for prescription refills. If we do not receive the request in this time, we cannot guarantee that your medication will be refilled in time.      IMPORTANT PHONE NUMBERS     Sleep Medicine Clinic Fax: [phone]  Appointments (for Adult Sleep Clinic): [phone] - option 2  Appointments (For Sleep Studies): [phone] - option 3  Behavioral Sleep Medicine: [phone]  Sleep Surgery: [phone]  ENT (Otolaryngology): [phone]  Headache Clinic (Neurology): [phone]  Neurology: [phone]  Psychiatry: [phone]  Pulmonary Function Testing (PFT) Center: [phone]  Pulmonary Medicine: [phone]  [employer] (DME): [phone]  [facility] (DME): [phone]  [facility] (DME): [phone]      OUR ADULT SLEEP MEDICINE TEAM   Please do not hesitate to call the office or sleep nurse with any questions between appointments:    Adult Sleep Nurses (Katerin Durand, RN and Jackeline Ng RN):  For clinical questions and refilling prescriptions: [phone]  Email sleep diaries and other documents at: [email or web address]    Adult Sleep Medicine Secretaries:  Unique Hansen (For Rory/[place]/Krise/Strohl/Yeh/Romero):   P: [phone]  F: [phone]  Sunita Pang (For [place]uggenbiller): P: [phone]  Fax:  [phone]  Raina Jain (For Jurcevic/Blank): P: [phone]  F: [phone]  Tona Ly (For Wilber): P: [phone]  F: [phone]  Damaris Jeronimo (For Lady/Vern/Zakhary): P: [phone]  F: [phone]  Julianna Hernandez (For Eddi/Andriy): P: [phone]  F: [phone]     Adult Sleep Medicine Advanced Practice Providers:  Amarjit Hillman ([place], [place])  Laly Porras ([facility])  Batool Mason CNP ([place], [place], [place])  Magdalena Nova CNP ([place], [place], [place])  Shira Merino ([place], [place], [place])  Prem Ashraf CNP ([place], [place])        OUR SLEEP TESTING LOCATIONS     Our team will contact you to schedule your sleep study, however, you can contact us as follow:  Main Phone Line (scheduling only): [phone]), option 3  Adult and Pediatric Locations  [facility] (6 years and older): [facility] - 4th floor ([address] [place] [address]) After hours line: [phone]  [facility] (Main campus: All ages): [facility], 6th floor. After hours line: [phone]   [place] (5 years and older; younger considered on case-by-case basis): [address]; [address], [address]. Scheduling  After hours line: [phone]   [place] (6 years and older): [address]; [address]; [address]   [place] (6 years and older): [address], [address]  After hours line: [phone]   [place] (13 years and older) in [place]: [address] [facility] [address] [facility], 2nd floor  After hours line: [phone]  [facility] (13 year and older): [address]  After hours line: [phone]     Adult Only Locations:   [place] (18 years and older): [address] [place] [address], 2nd floor   [place] (18 years and older): [address] [facility] [address]; 4th floor  After hours line: [phone]  [facility] (18 years and older) at [place]: [id number] [place] [address]  After hours line:  [phone]          CONTACTING YOUR SLEEP MEDICINE PROVIDER     Send a message directly to your provider through \"My Chart\", which is the email service through your  Records Account: [email or web address]   Call [phone] and leave a message. One of the administrative assistants will forward the message to your sleep medicine provider through \"My Chart\" and/or email.     Your sleep medicine provider for this visit was: Shavonne Nova PA-C    "

## 2024-03-11 NOTE — PROGRESS NOTES
Patient: Júnior Cam    [id number]  : 1985 -- AGE 38 y.o.    Provider: Shavonne Nova PA-C     Location [facility]   Service Date: 3/11/2024              [facility]  Followup Visit Note    HISTORY OF PRESENT ILLNESS     HISTORY OF PRESENT ILLNESS   Júnior Cam is a 38 y.o. male with h/o OTILIA who presents to a [facility] for followup.     PAST SLEEP HISTORY:  HSAT 3/9/2022  AHI3%: 12.3  AHI4%: 8.9  O2 josué: 75%         Assessment and plan from last visit: 2023  OBSTRUCTIVE SLEEP APNEA  -continue 4-12 cwp APAP with [facility]  -will continue to work on compliance/having trouble sleeping at this time due to back pain, seeing an orthopedist and participating in physical therapy; will work to increase compliance as able - compliance guidelines were discussed with patient today   -supply order update placed with [facility] today  -Sleep apnea and PAP therapy education was provided at length in clinic today.   -Diet, exercise, and weight loss were emphasized today in clinic, as were non-supine sleep, avoiding alcohol in the late evening, and driving or operating heavy machinery when sleepy.     RTC 6 months     Current History    On today's visit, the patient reports doing well without issue.  Reports for annual checkup.    Using a fullface mask.  He does report benefit with CPAP use including he says he is not snoring, feels better and less sleepy the next day.  Denies any other sleep concerns at this time.    ESS:  3  KATRINA:  0  FOSQ: 40    REVIEW OF SYSTEMS     REVIEW OF SYSTEMS  See HPI; all other ROS were reviewed and negative for compliant      ALLERGIES AND MEDICATIONS     ALLERGIES  Allergies   Allergen Reactions    Seasonale (91) [Levonorgestrel-Ethinyl Estrad] Runny nose       MEDICATIONS: He has a current medication list which includes the following prescription(s): diclofenac - Take 1 tablet (75 mg) by mouth 2 times a day as needed  (pain). Do not crush, chew, or split and omeprazole - Take 1 capsule (40 mg) by mouth if needed.    PAST MEDICAL HISTORY : He  has a past medical history of Personal history of diseases of the skin and subcutaneous tissue and Personal history of other diseases of the digestive system.    PAST SURGICAL HISTORY: He  has a past surgical history that includes Other surgical history (02/18/2022).     FAMILY HISTORY: No changes since previous visit. Otherwise non-contributory as charted.     SOCIAL HISTORY  He  reports that he has never smoked. He has never used smokeless tobacco. No history on file for alcohol use and drug use.       PHYSICAL EXAM     VITAL SIGNS: /86 (BP Location: Right arm, Patient Position: Sitting, BP Cuff Size: Adult)   Pulse 80   Temp 36.5 °C (97.7 °F)   Wt 93 kg (205 lb)   BMI 29.41 kg/m²        PREVIOUS WEIGHTS:  Wt Readings from Last 3 Encounters:   03/11/24 93 kg (205 lb)   11/15/23 92.1 kg (203 lb)   10/29/23 93.9 kg (207 lb 0.2 oz)       Constitutional: Alert and oriented, cooperative, no acute distress  Head: Normocephalic, atraumatic   Cranial Features: No abnormal craniofacial features  Neck: Supple. Trachea midline.  Pulmonary: Non-labored breathing, speaks in full sentences. No cough.    Cardiac: regular rate   Extremities: No clubbing, no edema  Neuromuscular: Cranial nerves grossly intact, no focal deficits      RESULTS/DATA     Bicarbonate (mmol/L)   Date Value   10/28/2023 27   12/22/2021 26       PAP Adherence      ASSESSMENT/PLAN     Mr. Cam is a 38 y.o. male and he returns in followup to the [facility] for OTILIA.    Problem List, Orders, Assessment, Recommendations:  Problem List Items Addressed This Visit             ICD-10-CM    Obstructive sleep apnea - Primary G47.33     -doing well on current pressure settings and is meeting compliance  -update supply order today  -avoid drowsy driving          Relevant Orders    Positive Airway  Pressure (PAP) Therapy       Disposition    Return to clinic in 12 months

## 2024-03-11 NOTE — ASSESSMENT & PLAN NOTE
-doing well on current pressure settings and is meeting compliance  -update supply order today  -avoid drowsy driving

## 2024-09-05 ENCOUNTER — APPOINTMENT (OUTPATIENT)
Dept: PRIMARY CARE | Facility: CLINIC | Age: 39
End: 2024-09-05
Payer: COMMERCIAL

## 2024-09-12 ENCOUNTER — APPOINTMENT (OUTPATIENT)
Dept: PRIMARY CARE | Facility: CLINIC | Age: 39
End: 2024-09-12
Payer: COMMERCIAL

## 2025-03-11 ENCOUNTER — APPOINTMENT (OUTPATIENT)
Dept: SLEEP MEDICINE | Facility: CLINIC | Age: 40
End: 2025-03-11
Payer: COMMERCIAL

## 2025-03-11 VITALS
RESPIRATION RATE: 16 BRPM | TEMPERATURE: 97.9 F | WEIGHT: 210 LBS | BODY MASS INDEX: 29.4 KG/M2 | HEART RATE: 73 BPM | SYSTOLIC BLOOD PRESSURE: 129 MMHG | OXYGEN SATURATION: 96 % | HEIGHT: 71 IN | DIASTOLIC BLOOD PRESSURE: 82 MMHG

## 2025-03-11 DIAGNOSIS — G47.33 OBSTRUCTIVE SLEEP APNEA: Primary | ICD-10-CM

## 2025-03-11 PROCEDURE — 99213 OFFICE O/P EST LOW 20 MIN: CPT | Performed by: PHYSICIAN ASSISTANT

## 2025-03-11 PROCEDURE — 3008F BODY MASS INDEX DOCD: CPT | Performed by: PHYSICIAN ASSISTANT

## 2025-03-11 PROCEDURE — 1036F TOBACCO NON-USER: CPT | Performed by: PHYSICIAN ASSISTANT

## 2025-03-11 SDOH — ECONOMIC STABILITY: FOOD INSECURITY: WITHIN THE PAST 12 MONTHS, THE FOOD YOU BOUGHT JUST DIDN'T LAST AND YOU DIDN'T HAVE MONEY TO GET MORE.: NEVER TRUE

## 2025-03-11 SDOH — ECONOMIC STABILITY: FOOD INSECURITY: WITHIN THE PAST 12 MONTHS, YOU WORRIED THAT YOUR FOOD WOULD RUN OUT BEFORE YOU GOT MONEY TO BUY MORE.: NEVER TRUE

## 2025-03-11 ASSESSMENT — ENCOUNTER SYMPTOMS
OCCASIONAL FEELINGS OF UNSTEADINESS: 0
LOSS OF SENSATION IN FEET: 0
DEPRESSION: 0

## 2025-03-11 ASSESSMENT — PATIENT HEALTH QUESTIONNAIRE - PHQ9
SUM OF ALL RESPONSES TO PHQ9 QUESTIONS 1 AND 2: 0
1. LITTLE INTEREST OR PLEASURE IN DOING THINGS: NOT AT ALL
2. FEELING DOWN, DEPRESSED OR HOPELESS: NOT AT ALL

## 2025-03-11 ASSESSMENT — PAIN SCALES - GENERAL: PAINLEVEL_OUTOF10: 0-NO PAIN

## 2025-03-11 ASSESSMENT — LIFESTYLE VARIABLES
AUDIT-C TOTAL SCORE: 3
SKIP TO QUESTIONS 9-10: 1
HOW OFTEN DO YOU HAVE A DRINK CONTAINING ALCOHOL: 2-3 TIMES A WEEK
HOW OFTEN DO YOU HAVE SIX OR MORE DRINKS ON ONE OCCASION: NEVER
HOW MANY STANDARD DRINKS CONTAINING ALCOHOL DO YOU HAVE ON A TYPICAL DAY: 1 OR 2

## 2025-03-11 ASSESSMENT — COLUMBIA-SUICIDE SEVERITY RATING SCALE - C-SSRS
1. IN THE PAST MONTH, HAVE YOU WISHED YOU WERE DEAD OR WISHED YOU COULD GO TO SLEEP AND NOT WAKE UP?: NO
6. HAVE YOU EVER DONE ANYTHING, STARTED TO DO ANYTHING, OR PREPARED TO DO ANYTHING TO END YOUR LIFE?: NO
2. HAVE YOU ACTUALLY HAD ANY THOUGHTS OF KILLING YOURSELF?: NO

## 2025-03-11 NOTE — PROGRESS NOTES
Patient: Júnior Cam    [id number]  : 1985 -- AGE 39 y.o.    Provider: Shavonne Nova PA-C     Location [facility]   Service Date: 3/11/2025              [facility]  Followup Visit Note    HISTORY OF PRESENT ILLNESS     HISTORY OF PRESENT ILLNESS   Júnior Cam is a 39 y.o. male with h/o OTILIA  who presents to a [facility] for followup.       PAST SLEEP HISTORY:  HSAT 3/9/2022  AHI3%: 12.3  AHI4%: 8.9  O2 josué: 75%    Assessment and plan from last visit: 3/11/2024-   Obstructive sleep apnea - Primary G47.33        -doing well on current pressure settings and is meeting compliance  -update supply order today  -avoid drowsy driving            Relevant Orders     Positive Airway Pressure (PAP) Therapy         Current History    On today's visit, the patient reports her for annual follow up regarding sleep apnea/cpap. Continues to use nightly, experiences benefit including feeling more refreshed and not as tired during the day.     Denies any other sleep related issues or concerns.     Reports diet OK, very active for work- [employer], works 12-13hr days.         ESS:  2  KATRINA:  0  FOSQ: 40    REVIEW OF SYSTEMS     REVIEW OF SYSTEMS  See HPI; all other ROS were reviewed and negative for compliant      ALLERGIES AND MEDICATIONS     ALLERGIES  Allergies   Allergen Reactions    Seasonale (91) [Levonorgestrel-Ethinyl Estrad] Runny nose       MEDICATIONS: He has a current medication list which includes the following prescription(s): omeprazole - Take 1 capsule (40 mg) by mouth if needed.    PAST MEDICAL HISTORY : He  has a past medical history of Personal history of diseases of the skin and subcutaneous tissue and Personal history of other diseases of the digestive system.    PAST SURGICAL HISTORY: He  has a past surgical history that includes Other surgical history (2022).     FAMILY HISTORY: No changes since previous  visit. Otherwise non-contributory as charted.     SOCIAL HISTORY  He  reports that he has never smoked. He has never used smokeless tobacco. No history on file for alcohol use and drug use.       PHYSICAL EXAM     VITAL SIGNS: There were no vitals taken for this visit.       PREVIOUS WEIGHTS:  Wt Readings from Last 3 Encounters:   03/11/24 93 kg (205 lb)   11/15/23 92.1 kg (203 lb)   10/29/23 93.9 kg (207 lb 0.2 oz)       Constitutional: Alert and oriented, cooperative, no acute distress  Head: Normocephalic, atraumatic   Cranial Features: No abnormal craniofacial features  Neck: Supple. Trachea midline.  Pulmonary: Non-labored breathing, speaks in full sentences. No cough.    Cardiac: regular rate   Extremities: No clubbing, no edema  Neuromuscular: Cranial nerves grossly intact, no focal deficits      RESULTS/DATA     Bicarbonate (mmol/L)   Date Value   10/28/2023 27   12/22/2021 26       PAP Adherence  PAP Download reviewed?: A PAP adherence download was obtained and data was reviewed personally today in clinic.      ASSESSMENT/PLAN     Mr. Cam is a 39 y.o. male and he returns in followup to the [facility] for OTILIA.    Problem List, Orders, Assessment, Recommendations:  Problem List Items Addressed This Visit             ICD-10-CM    Obstructive sleep apnea - Primary G47.33     -meeting compliance, experiencing good benefit from pap therapy  -update supply order with [facility]  -avoid drowsy driving            Relevant Orders    Positive Airway Pressure (PAP) Therapy     Overweight- encourage healthy diet/exercise    Has not seen his PCP in >1 year- encouraged him to follow up  for health maintenance      Disposition    Return to clinic in 12 months

## (undated) DEVICE — TROCAR SYSTEM, BALLOON, KII GELPORT, 12 X 100MM

## (undated) DEVICE — DRESSING, DRAIN SPONGE, EXCILON AMD, 4X4 IN, 6 PLY, ST

## (undated) DEVICE — NEEDLE, HYPODERMIC, SAFETYGLIDE, SHIELDING, REGULAR WALL, REGULAR BEVEL, 22 G X 1.5 IN, BLACK HUB

## (undated) DEVICE — CARE KIT, LAPAROSCOPIC, ADVANCED

## (undated) DEVICE — IRRIGATOR, HYDRO-SURG PLUS, WITH COJOINED SUCTION AND IRRIGATION

## (undated) DEVICE — Device

## (undated) DEVICE — DRESSING, GAUZE, SPONGE, 8 PLY, CURITY, 2 X 2 IN, STERILE

## (undated) DEVICE — TROCAR, KII OPTICAL BLADELESS 5MM Z THREAD 100MM LNGTH

## (undated) DEVICE — SLEEVE, VASO PRESS, CALF GARMENT, MEDIUM, GREEN

## (undated) DEVICE — DRESSING, TRANSPARENT, TEGADERM, FILM FRAME STYLE, LF

## (undated) DEVICE — GRASPER TIP, LONG FENESTRATED, DISP

## (undated) DEVICE — STRIP, SKIN CLOSURE, STERI STRIP, REINFORCED, 0.5 X 4 IN

## (undated) DEVICE — GRASPER TIP, ALLIS, 5MM, DISP

## (undated) DEVICE — APPLICATOR, CHLORAPREP, W/ORANGE TINT, 26ML

## (undated) DEVICE — HOLSTER, ELECTROSURGERY ACCESSORY, STERILE

## (undated) DEVICE — CATHETER TRAY, SURESTEP, 16FR, URINE METER W/STATLOCK

## (undated) DEVICE — FILTER, LAPAROSCOPIC, PLUME PORT, W/LUER CONNECTOR, STERILE

## (undated) DEVICE — DRAIN, WOUND, FLAT, JACKSON-PRATT, FULL PERFORATION, W/O TROCAR, 10 MM, SILICONE

## (undated) DEVICE — SLEEVE, KII, Z-THREAD, 5X100CM

## (undated) DEVICE — STAPLER, ENDO ECHELON 45MM RELOAD, WHITE, REUSABLE

## (undated) DEVICE — STAPLER, ECHELON 3000, 45MM STD

## (undated) DEVICE — SCISSOR TIP, ENDOCUT, LAPAROSCOPIC

## (undated) DEVICE — MARKER, SKIN, DUAL TIP, W/RULER

## (undated) DEVICE — RESERVOIR, DRAINAGE, WOUND, JACKSON-PRATT, 100 CC, SILICONE

## (undated) DEVICE — SPONGE, LAP, XRAY DECT, 4IN X 18IN, W/MASTER DMT, STERILE

## (undated) DEVICE — RETRIEVAL SYSTEM, MONARCH, 10MM DISP ENDOSCOPIC

## (undated) DEVICE — GRASPER TIP, BABCOCK, 5MM, DISP